# Patient Record
(demographics unavailable — no encounter records)

---

## 2025-04-29 NOTE — PHYSICAL EXAM
[Disheveled] : disheveled [Cooperative] : cooperative [Euthymic] : euthymic [Inappropriate] : inappropriate [Clear] : clear [Linear/Goal Directed] : linear/goal directed [None Reported] : none reported [Avoidant] : avoidant [Soft] : soft [Average] : average [Difficulty acknowledging presence of psychiatric problems] : Difficulty acknowledging presence of psychiatric problems [WNL] : within normal limits [Not applicable] : not applicable [Normal] : normal [None] : none [FreeTextEntry5] : superficially cooperative; guarded [de-identified] : frequent inappropriate laughter; pt appeared tearful at one point and anxious at other times

## 2025-04-29 NOTE — RISK ASSESSMENT
[Clinical Interview] : Clinical Interview [Yes (details below)] : yes [Yes] : yes [No known risk factors] : No known risk factors [Residential stability] : residential stability [No] : no [Mood disorder] : mood disorder [Change in provider or treatment (i.e., medications, psychotherapy, milieu)] : change in provider or treatment (i.e., medications, psychotherapy, milieu) [Triggering events leading to humiliation, shame, and/or despair] : triggering events leading to humiliation, shame, and/or despair (e.g. loss of relationship, financial or health status) (real or anticipated) [Identifies reasons for living] : identifies reasons for living [Supportive social network of family or friends] : supportive social network of family or friends [Christianity beliefs] : Restorationism beliefs [Engaged in work or school] : engaged in work or school [TextBox_32] : Pt reports she was impulsive; [None in the patient's lifetime] : None in the patient's lifetime [None Known] : none known

## 2025-04-29 NOTE — RISK ASSESSMENT
[Clinical Interview] : Clinical Interview [Yes (details below)] : yes [Yes] : yes [No known risk factors] : No known risk factors [Residential stability] : residential stability [No] : no [Mood disorder] : mood disorder [Change in provider or treatment (i.e., medications, psychotherapy, milieu)] : change in provider or treatment (i.e., medications, psychotherapy, milieu) [Triggering events leading to humiliation, shame, and/or despair] : triggering events leading to humiliation, shame, and/or despair (e.g. loss of relationship, financial or health status) (real or anticipated) [Identifies reasons for living] : identifies reasons for living [Supportive social network of family or friends] : supportive social network of family or friends [Episcopalian beliefs] : Spiritism beliefs [Engaged in work or school] : engaged in work or school [TextBox_32] : Pt reports she was impulsive; [None in the patient's lifetime] : None in the patient's lifetime [None Known] : none known

## 2025-04-29 NOTE — DISCUSSION/SUMMARY
[Low acute suicide risk] : Low acute suicide risk [Yes] : Yes [Not clinically indicated] : Safety Plan completed/updated (for individuals at risk): Not clinically indicated [FreeTextEntry1] : Risk factors:  h/o SI, h/o SIB, depressive symptoms, anxiety symptoms, psychosocial stressors, h/o trauma, not in treatment  Protective factors: female gender, age, domiciled with supportive family, engaged in school, no prior SA , not current si/i/p, no h/o violence, no current hi/i/p, future oriented with short and long term goals, barriers to suicide, no access to guns,  not acutely manic or psychotic, no substance abuse, no trauma hx, no family history of suicide

## 2025-04-29 NOTE — PHYSICAL EXAM
[Disheveled] : disheveled [Cooperative] : cooperative [Euthymic] : euthymic [Inappropriate] : inappropriate [Clear] : clear [Linear/Goal Directed] : linear/goal directed [None Reported] : none reported [Avoidant] : avoidant [Soft] : soft [Average] : average [Difficulty acknowledging presence of psychiatric problems] : Difficulty acknowledging presence of psychiatric problems [WNL] : within normal limits [Not applicable] : not applicable [Normal] : normal [None] : none [FreeTextEntry5] : superficially cooperative; guarded [de-identified] : frequent inappropriate laughter; pt appeared tearful at one point and anxious at other times

## 2025-04-29 NOTE — ADDENDUM
[FreeTextEntry1] : Patient was seen and examined by me, Dr. Shelli Cardona virtually with mother's consent. pt was at [facility] and writer was at home. I reviewed and agreed with the findings and plan as documented in the psychologist's note, unless noted below. writer initially evaluated the pt in 2023 at [facility] where she described anxiety and ocd-like symptoms with intrusive thoughts. at the time, lexapro 5mg was started and by second f/u visit, pt reported the symptoms were improving. since then, lexapro was stopped and pt was seeing a therapist in [facility]. pt struggled with significant social stressor at school and was target of bullying. pt did engage in nssib by cutting herself and did report vague si without plan/intent a few months ago while she was still in district school and was bullied. pt has since transferred to [employer], and although she has only been there a short amount of time, pt states that she feels more comfortable in this very small school setting. she is focused on academics and states that she is not currently interested in making new friends at this school. pt denies any nssib or si in the past few months. she remains future oriented. lethal means restriction discussed with mother by psychologist.   pt is not at imminent risk for suicide or homicide, is able to engage in safety planning, is not grossly manic or psychotic, has parents who are able to follow through with treatment recommendations, and is therefore not meeting criteria for involuntary psychiatric hospitalization. They are appropriate for outpatient management and would benefit from outpatient therapy.

## 2025-04-29 NOTE — PLAN
[Patient] : patient [Family] : family [Provision of National Suicide Prevention Lifeline 4-118-763-TALK (2368)] : Provision of national suicide prevention lifeline 9-963-380-talk (5728) [Education provided regarding environmental safety/ lethal means restriction] : Education provided regarding environmental safety/ lethal means restriction [None on Record] : none on record [TextBox_9] : referral to therapy  [TextBox_11] : family not seeing referral to psychiatry  [TextBox_13] :  no acute safety concerns at this time. no guns at home. family should call 911 or go to nearest ER or any acute safety concerns. [TextBox_26] : school consent not signed

## 2025-04-29 NOTE — RISK ASSESSMENT
[Clinical Interview] : Clinical Interview [Yes (details below)] : yes [Yes] : yes [No known risk factors] : No known risk factors [Residential stability] : residential stability [No] : no [Mood disorder] : mood disorder [Change in provider or treatment (i.e., medications, psychotherapy, milieu)] : change in provider or treatment (i.e., medications, psychotherapy, milieu) [Triggering events leading to humiliation, shame, and/or despair] : triggering events leading to humiliation, shame, and/or despair (e.g. loss of relationship, financial or health status) (real or anticipated) [Identifies reasons for living] : identifies reasons for living [Supportive social network of family or friends] : supportive social network of family or friends [Druze beliefs] : Lutheran beliefs [Engaged in work or school] : engaged in work or school [TextBox_32] : Pt reports she was impulsive; [None in the patient's lifetime] : None in the patient's lifetime [None Known] : none known

## 2025-04-29 NOTE — PHYSICAL EXAM
[Disheveled] : disheveled [Cooperative] : cooperative [Euthymic] : euthymic [Inappropriate] : inappropriate [Clear] : clear [Linear/Goal Directed] : linear/goal directed [None Reported] : none reported [Avoidant] : avoidant [Soft] : soft [Average] : average [Difficulty acknowledging presence of psychiatric problems] : Difficulty acknowledging presence of psychiatric problems [WNL] : within normal limits [Not applicable] : not applicable [Normal] : normal [None] : none [FreeTextEntry5] : superficially cooperative; guarded [de-identified] : frequent inappropriate laughter; pt appeared tearful at one point and anxious at other times

## 2025-04-29 NOTE — PLAN
[Patient] : patient [Family] : family [Provision of National Suicide Prevention Lifeline 0-425-783-TALK (8914)] : Provision of national suicide prevention lifeline 2-077-661-talk (9926) [Education provided regarding environmental safety/ lethal means restriction] : Education provided regarding environmental safety/ lethal means restriction [None on Record] : none on record [TextBox_9] : referral to therapy  [TextBox_11] : family not seeing referral to psychiatry  [TextBox_13] :  no acute safety concerns at this time. no guns at home. family should call 911 or go to nearest ER or any acute safety concerns. [TextBox_26] : school consent not signed

## 2025-04-29 NOTE — HISTORY OF PRESENT ILLNESS
[Suicidal Behavior/Ideation] : suicidal behavior/ideation [Not Applicable] : Not applicable [FreeTextEntry1] : Patient is a 15 year-old female, domiciled with parents and sister age 14, enrolled in [employer] (private school), in the 10th grade regular education, with prior psychiatric history of OCD/KENAN, previously in outpatient treatment, no h/o psychiatric hospitalizations, h/o of self-injury, no h/o suicide attempts, no h/o aggression/violence/legal issues, no CPS involvement, no substance use, no known trauma hx, no significant pmhx. pt originally seen at [facility] in 2023 for anxiety and ocd symptoms in the context of feeling overwhelmed with academics as well as being bullied. she was started on lexapro 5mg daily and then connected to therapy and psychiatry.  now presenting accompanied by mother, referred by mother, for school avoidance and looking to be connected to new therapist.   Mom did not provide consent. Pt recently started new private school ([employer]). Attended [employer].  Psychologist met with pt. When asked why pt is here, pt reports mom wanted to bring her in to make sure she was "OK." Pt reports she was bullied by peers at school. Pt struggled to respond to queries, appearing to withhold information and guarded. Pt reports she was asked to apologize to another peer and states she was "gaslighted." Pt exhibited long pauses when attempting to respond. Pt reports rumors were spread about her behavior and pt acknowledges doing only one of the things. Pt did not report bullying to her school. Pt missed around two weeks of school. However, now pt reports she attends [employer]. Pt reports she thinks her new school is OK.  Pt has not made friends at her new school.  Pt reports she still has friends she speaks with at her old school.  Pt reports things at home are good. Pt reports positive relationship with both parents but feels closer to her mom.  Pt reports she gets along well with her sister. Pt reports feeling safe at home. Pt reports she used to have obsessions about doing things a certain number of times but denies this to be a problem anymore. Pt denies engaging in rituals of handwashing or checking as she did before. Pt was worried about people spreading rumors about her.  Pt denies anxiety about anything except finishing her homework. Pt likes to sing and write stories. Pt reports she was involved in literary club and enjoys exercise.  Pt reports her sleep to be OK. Pt reports her appetite is OK. Pt reports h/o self-harm on several occasions. Reports last instance was several months ago. Pt reports she procrastinate and thinks she needs to be more motivated. Pt reports she is a Jain and Ray motivates her. Pt states she used to attend [employer] weekly but has not since the pandemic. Throughout interview, pt giggling and exhibits inappropriate affect in relation to subject manner.  Pt denies symptoms of florentino or psychosis. Pt denies problem inattention/hyperactivity/behavioral. Pt denies any safety concerns at school. Pt reports she wants to be a psychologist. Pt reports her three wishes are for family to stay safe and happy. Pt denies any prior h/o family being unsafe or targeted.  Psychologist met with mom for collateral information. Mom reports currently, pt does not have any school avoidance but did when she was the target of bullying at her former school. Mom reports pt did inform school that she was bullied. Pt identified a group of peers but did not say the name of peers due to fear. Per mom, pt was scared, depressed, and did not wish to go to school and pt threatened to hurt herself if she was not transitioned to a different school. As a result, parents removed pt from the school. Mom reports pt has had h/o of mental health treatment with various providers, whom mom was unable to provide information about. Mom reports pt always thinking that other people are talking about her at her new school or looking at her. Mom reports pt exhibits this behavior in other settings outside of school. Mom unaware if pt is self-harming. Mom reports pt has social anxiety. Mom reports pt has not had any school avoidance since starting new school. Mom reports pt procrastinates her schoolwork. Mom wishes for pt to engage in tx; however, pt has expressed that prior therapists did not help her.   [FreeTextEntry2] : Dx with OCD/KENAN in Oct/Nov of 9th grade. Pt discontinued tx in Feb 2025 and attended for approximately a little over one year. Pt believes the therapy was helpful but was unable to identify what specifically was helpful.  Patient has not had any past psychiatric visits, hospitalizations, medication trials. Pt has seen four different therapists and mom reports pt has expressed none of them had helped her. Hx of suicidality and self- injurious behavior in the past. No h/o suicidal attempts. [FreeTextEntry3] : lexapro in 2023

## 2025-04-29 NOTE — ADDENDUM
[FreeTextEntry1] : Patient was seen and examined by me, Dr. Shelli Cardona virtually with mother's consent. pt was at [facility] and writer was at home. I reviewed and agreed with the findings and plan as documented in the psychologist's note, unless noted below. writer initially evaluated the pt in 2023 at [facility] where she described anxiety and ocd-like symptoms with intrusive thoughts. at the time, lexapro 5mg was started and by second f/u visit, pt reported the symptoms were improving. since then, lexapro was stopped and pt was seeing a therapist in [facility]. pt struggled with significant social stressor at school and was target of bullying. pt did engage in nssib by cutting herself and did report vague si without plan/intent a few months ago while she was still in district school and was bullied. pt has since transferred to [employer] and although she has only been there a short amount of time, pt states that she feels more comfortable in this very small school setting. she is focused on academics and states that she is not currently interested in making new friends at this school. pt denies any nssib or si in the past few months. she remains future oriented. lethal means restriction discussed with mother by psychologist.   pt is not at imminent risk for suicide or homicide, is able to engage in safety planning, is not grossly manic or psychotic, has parents who are able to follow through with treatment recommendations, and is therefore not meeting criteria for involuntary psychiatric hospitalization. They are appropriate for outpatient management and would benefit from outpatient therapy.

## 2025-04-29 NOTE — HISTORY OF PRESENT ILLNESS
[Suicidal Behavior/Ideation] : suicidal behavior/ideation [Not Applicable] : Not applicable [FreeTextEntry1] : Patient is a 15 year-old female, domiciled with parents and sister age 14, enrolled in [employer] (private school), in the 10th grade regular education, with prior psychiatric history of OCD/KENAN, previously in outpatient treatment, no h/o psychiatric hospitalizations, h/o of self-injury, no h/o suicide attempts, no h/o aggression/violence/legal issues, no CPS involvement, no substance use, no known trauma hx, no significant pmhx. pt originally seen at [facility] in 2023 for anxiety and ocd symptoms in the context of feeling overwhelmed with academics as well as being bullied. she was started on lexapro 5mg daily and then connected to therapy and psychiatry.  now presenting accompanied by mother, referred by mother, for school avoidance and looking to be connected to new therapist.   Mom did not provide consent. Pt recently started new private school ([employer]). Attended [employer].  Psychologist met with pt. When asked why pt is here, pt reports mom wanted to bring her in to make sure she was "OK." Pt reports she was bullied by peers at school. Pt struggled to respond to queries, appearing to withhold information and guarded. Pt reports she was asked to apologize to another peer and states she was "gaslighted." Pt exhibited long pauses when attempting to respond. Pt reports rumors were spread about her behavior and pt acknowledges doing only one of the things. Pt did not report bullying to her school. Pt missed around two weeks of school. However, now pt reports she attends [employer]. Pt reports she thinks her new school is OK.  Pt has not made friends at her new school.  Pt reports she still has friends she speaks with at her old school.  Pt reports things at home are good. Pt reports positive relationship with both parents but feels closer to her mom.  Pt reports she gets along well with her sister. Pt reports feeling safe at home. Pt reports she used to have obsessions about doing things a certain number of times but denies this to be a problem anymore. Pt denies engaging in rituals of handwashing or checking as she did before. Pt was worried about people spreading rumors about her.  Pt denies anxiety about anything except finishing her homework. Pt likes to sing and write stories. Pt reports she was involved in literary club and enjoys exercise.  Pt reports her sleep to be OK. Pt reports her appetite is OK. Pt reports h/o self-harm on several occasions. Reports last instance was several months ago. Pt reports she procrastinate and thinks she needs to be more motivated. Pt reports she is a Buddhist and Ray motivates her. Pt states she used to attend [employer] weekly but has not since the pandemic. Throughout interview, pt giggling and exhibits inappropriate affect in relation to subject manner.  Pt denies symptoms of florentino or psychosis. Pt denies problem inattention/hyperactivity/behavioral. Pt denies any safety concerns at school. Pt reports she wants to be a psychologist. Pt reports her three wishes are for family to stay safe and happy. Pt denies any prior h/o family being unsafe or targeted.  Psychologist met with mom for collateral information. Mom reports currently, pt does not have any school avoidance but did when she was the target of bullying at her former school. Mom reports pt did inform school that she was bullied. Pt identified a group of peers but did not say the name of peers due to fear. Per mom, pt was scared, depressed, and did not wish to go to school and pt threatened to hurt herself if she was not transitioned to a different school. As a result, parents removed pt from the school. Mom reports pt has had h/o of mental health treatment with various providers, whom mom was unable to provide information about. Mom reports pt always thinking that other people are talking about her at her new school or looking at her. Mom reports pt exhibits this behavior in other settings outside of school. Mom unaware if pt is self-harming. Mom reports pt has social anxiety. Mom reports pt has not had any school avoidance since starting new school. Mom reports pt procrastinates her schoolwork. Mom wishes for pt to engage in tx; however, pt has expressed that prior therapists did not help her.   [FreeTextEntry2] : Dx with OCD/KENAN in Oct/Nov of 9th grade. Pt discontinued tx in Feb 2025 and attended for approximately a little over one year. Pt believes the therapy was helpful but was unable to identify what specifically was helpful.  Patient has not had any past psychiatric visits, hospitalizations, medication trials. Pt has seen four different therapists and mom reports pt has expressed none of them had helped her. Hx of suicidality and self- injurious behavior in the past. No h/o suicidal attempts. [FreeTextEntry3] : lexapro in 2023

## 2025-04-29 NOTE — REASON FOR VISIT
[Behavioral Health Urgent Care Assessment] : a behavioral health urgent care assessment [Patient] : patient [Self] : alone [Mother] : with mother [TextBox_17] : school avoidance

## 2025-04-29 NOTE — ADDENDUM
[FreeTextEntry1] : Patient was seen and examined by me, Dr. Shelli Cardona virtually with mother's consent. pt was at [facility] and writer was at home. I reviewed and agreed with the findings and plan as documented in the psychologist's note, unless noted below. writer initially evaluated the pt in 2023 at [facility] where she described anxiety and ocd-like symptoms with intrusive thoughts. at the time, lexapro 5mg was started and by second f/u visit, pt reported the symptoms were improving. since then, lexapro was stopped and pt was seeing a therapist in [facility]. pt struggled with significant social stressor at school and was target of bullying. pt did engage in nssib by cutting herself and did report vague si without plan/intent a few months ago while she was still in district school and was bullied. pt has since transferred to [employer], and although she has only been there a short amount of time, pt states that she feels more comfortable in this very small school setting. she is focused on academics and states that she is not currently interested in making new friends at this school. pt denies any nssib or si in the past few months. she remains future oriented. lethal means restriction discussed with mother by psychologist.   pt is not at imminent risk for suicide or homicide, is able to engage in safety planning, is not grossly manic or psychotic, has parents who are able to follow through with treatment recommendations, and is therefore not meeting criteria for involuntary psychiatric hospitalization. They are appropriate for outpatient management and would benefit from outpatient therapy.

## 2025-04-29 NOTE — PLAN
[Patient] : patient [Family] : family [Provision of National Suicide Prevention Lifeline 0-418-378-TALK (5731)] : Provision of national suicide prevention lifeline 1-079-927-talk (8935) [Education provided regarding environmental safety/ lethal means restriction] : Education provided regarding environmental safety/ lethal means restriction [None on Record] : none on record [TextBox_9] : referral to therapy  [TextBox_11] : family not seeing referral to psychiatry  [TextBox_13] :  no acute safety concerns at this time. no guns at home. family should call 911 or go to nearest ER or any acute safety concerns. [TextBox_26] : school consent not signed

## 2025-04-29 NOTE — HISTORY OF PRESENT ILLNESS
[Suicidal Behavior/Ideation] : suicidal behavior/ideation [Not Applicable] : Not applicable [FreeTextEntry1] : Patient is a 15 year-old female, domiciled with parents and sister age 14, enrolled in [employer] (private school), in the 10th grade regular education, with prior psychiatric history of OCD/KENAN, previously in outpatient treatment, no h/o psychiatric hospitalizations, h/o of self-injury, no h/o suicide attempts, no h/o aggression/violence/legal issues, no CPS involvement, no substance use, no known trauma hx, no significant pmhx. pt originally seen at [facility] in 2023 for anxiety and ocd symptoms in the context of feeling overwhelmed with academics as well as being bullied. she was started on lexapro 5mg daily and then connected to therapy and psychiatry.  now presenting accompanied by mother, referred by mother, for school avoidance and looking to be connected to new therapist.   Mom did not provide consent. Pt recently started new private school ([employer]). Attended [employer].  Psychologist met with pt. When asked why pt is here, pt reports mom wanted to bring her in to make sure she was "OK." Pt reports she was bullied by peers at school. Pt struggled to respond to queries, appearing to withhold information and guarded. Pt reports she was asked to apologize to another peer and states she was "gaslighted." Pt exhibited long pauses when attempting to respond. Pt reports rumors were spread about her behavior and pt acknowledges doing only one of the things. Pt did not report bullying to her school. Pt missed around two weeks of school. However, now pt reports she attends [employer]. Pt reports she thinks her new school is OK.  Pt has not made friends at her new school.  Pt reports she still has friends she speaks with at her old school.  Pt reports things at home are good. Pt reports positive relationship with both parents but feels closer to her mom.  Pt reports she gets along well with her sister. Pt reports feeling safe at home. Pt reports she used to have obsessions about doing things a certain number of times but denies this to be a problem anymore. Pt denies engaging in rituals of handwashing or checking as she did before. Pt was worried about people spreading rumors about her.  Pt denies anxiety about anything except finishing her homework. Pt likes to sing and write stories. Pt reports she was involved in literary club and enjoys exercise.  Pt reports her sleep to be OK. Pt reports her appetite is OK. Pt reports h/o self-harm on several occasions. Reports last instance was several months ago. Pt reports she procrastinate and thinks she needs to be more motivated. Pt reports she is a [employer]ism and Ray motivates her. Pt states she used to attend [employer] weekly but has not since the pandemic. Throughout interview, pt giggling and exhibits inappropriate affect in relation to subject manner.  Pt denies symptoms of florentino or psychosis. Pt denies problem inattention/hyperactivity/behavioral. Pt denies any safety concerns at school. Pt reports she wants to be a psychologist. Pt reports her three wishes are for family to stay safe and happy. Pt denies any prior h/o family being unsafe or targeted.  Psychologist met with mom for collateral information. Mom reports currently, pt does not have any school avoidance but did when she was the target of bullying at her former school. Mom reports pt did inform school that she was bullied. Pt identified a group of peers but did not say the name of peers due to fear. Per mom, pt was scared, depressed, and did not wish to go to school and pt threatened to hurt herself if she was not transitioned to a different school. As a result, parents removed pt from the school. Mom reports pt has had h/o of mental health treatment with various providers, whom mom was unable to provide information about. Mom reports pt always thinking that other people are talking about her at her new school or looking at her. Mom reports pt exhibits this behavior in other settings outside of school. Mom unaware if pt is self-harming. Mom reports pt has social anxiety. Mom reports pt has not had any school avoidance since starting new school. Mom reports pt procrastinates her schoolwork. Mom wishes for pt to engage in tx; however, pt has expressed that prior therapists did not help her.   [FreeTextEntry2] : Dx with OCD/KENAN in Oct/Nov of 9th grade. Pt discontinued tx in Feb 2025 and attended for approximately a little over one year. Pt believes the therapy was helpful but was unable to identify what specifically was helpful.  Patient has not had any past psychiatric visits, hospitalizations, medication trials. Pt has seen four different therapists and mom reports pt has expressed none of them had helped her. Hx of suicidality and self- injurious behavior in the past. No h/o suicidal attempts. [FreeTextEntry3] : lexapro in 2023